# Patient Record
Sex: FEMALE | Race: WHITE | NOT HISPANIC OR LATINO | Employment: OTHER | URBAN - METROPOLITAN AREA
[De-identification: names, ages, dates, MRNs, and addresses within clinical notes are randomized per-mention and may not be internally consistent; named-entity substitution may affect disease eponyms.]

---

## 2017-02-07 ENCOUNTER — OFFICE VISIT - HEALTHEAST (OUTPATIENT)
Dept: FAMILY MEDICINE | Facility: CLINIC | Age: 62
End: 2017-02-07

## 2017-02-07 DIAGNOSIS — K12.0 APHTHOUS ULCER: ICD-10-CM

## 2017-02-07 DIAGNOSIS — J00 ACUTE NASOPHARYNGITIS: ICD-10-CM

## 2017-02-07 DIAGNOSIS — R59.0 ANTERIOR CERVICAL LYMPHADENOPATHY: ICD-10-CM

## 2017-02-07 ASSESSMENT — MIFFLIN-ST. JEOR: SCORE: 992.2

## 2017-03-03 ENCOUNTER — COMMUNICATION - HEALTHEAST (OUTPATIENT)
Dept: TELEHEALTH | Facility: CLINIC | Age: 62
End: 2017-03-03

## 2017-03-17 ENCOUNTER — RECORDS - HEALTHEAST (OUTPATIENT)
Dept: ADMINISTRATIVE | Facility: OTHER | Age: 62
End: 2017-03-17

## 2017-03-17 ENCOUNTER — OFFICE VISIT - HEALTHEAST (OUTPATIENT)
Dept: INTERNAL MEDICINE | Facility: CLINIC | Age: 62
End: 2017-03-17

## 2017-03-17 DIAGNOSIS — M85.80 OSTEOPENIA: ICD-10-CM

## 2017-03-17 DIAGNOSIS — R07.9 CHEST PAIN, UNSPECIFIED TYPE: ICD-10-CM

## 2017-03-17 DIAGNOSIS — Z12.11 SCREENING FOR COLON CANCER: ICD-10-CM

## 2017-03-17 DIAGNOSIS — R93.7 ABNORMAL BONE DENSITY SCREENING: ICD-10-CM

## 2017-03-17 DIAGNOSIS — Z13.1 SCREENING FOR DIABETES MELLITUS: ICD-10-CM

## 2017-03-17 DIAGNOSIS — R59.0 LOCALIZED ENLARGED LYMPH NODES: ICD-10-CM

## 2017-03-17 DIAGNOSIS — Z00.00 ROUTINE GENERAL MEDICAL EXAMINATION AT A HEALTH CARE FACILITY: ICD-10-CM

## 2017-03-17 DIAGNOSIS — E78.49 FAMILIAL HYPERLIPIDEMIA: ICD-10-CM

## 2017-03-17 DIAGNOSIS — Z12.31 OTHER SCREENING MAMMOGRAM: ICD-10-CM

## 2017-03-17 LAB
CHOLEST SERPL-MCNC: 213 MG/DL
FASTING STATUS PATIENT QL REPORTED: ABNORMAL
HBA1C MFR BLD: 5.8 % (ref 3.5–6)
HDLC SERPL-MCNC: 102 MG/DL
LDLC SERPL CALC-MCNC: 100 MG/DL
TRIGL SERPL-MCNC: 57 MG/DL

## 2017-03-17 RX ORDER — SAW/VIT E/SOD SEL/LYC/BETA/PYG 160-100
1 TABLET ORAL DAILY
Status: SHIPPED | COMMUNITY
Start: 2016-04-13 | End: 2021-11-30 | Stop reason: ALTCHOICE

## 2017-03-17 ASSESSMENT — MIFFLIN-ST. JEOR: SCORE: 994.48

## 2017-03-20 ENCOUNTER — COMMUNICATION - HEALTHEAST (OUTPATIENT)
Dept: INTERNAL MEDICINE | Facility: CLINIC | Age: 62
End: 2017-03-20

## 2017-03-23 ENCOUNTER — HOSPITAL ENCOUNTER (OUTPATIENT)
Dept: MAMMOGRAPHY | Facility: CLINIC | Age: 62
Discharge: HOME | End: 2017-03-23
Attending: INTERNAL MEDICINE

## 2017-03-23 ENCOUNTER — RECORDS - HEALTHEAST (OUTPATIENT)
Dept: BONE DENSITY | Facility: CLINIC | Age: 62
End: 2017-03-23

## 2017-03-23 ENCOUNTER — HOSPITAL ENCOUNTER (OUTPATIENT)
Dept: ULTRASOUND IMAGING | Facility: CLINIC | Age: 62
Discharge: HOME | End: 2017-03-23
Attending: INTERNAL MEDICINE

## 2017-03-23 ENCOUNTER — RECORDS - HEALTHEAST (OUTPATIENT)
Dept: ADMINISTRATIVE | Facility: OTHER | Age: 62
End: 2017-03-23

## 2017-03-23 ENCOUNTER — COMMUNICATION - HEALTHEAST (OUTPATIENT)
Dept: CARDIOLOGY | Facility: CLINIC | Age: 62
End: 2017-03-23

## 2017-03-23 DIAGNOSIS — M85.80 OTHER SPECIFIED DISORDERS OF BONE DENSITY AND STRUCTURE, UNSPECIFIED SITE: ICD-10-CM

## 2017-03-23 DIAGNOSIS — Z12.31 OTHER SCREENING MAMMOGRAM: ICD-10-CM

## 2017-03-23 DIAGNOSIS — N63.0 BREAST LUMP: ICD-10-CM

## 2017-03-23 DIAGNOSIS — R59.0 LOCALIZED ENLARGED LYMPH NODES: ICD-10-CM

## 2017-03-27 ENCOUNTER — COMMUNICATION - HEALTHEAST (OUTPATIENT)
Dept: INTERNAL MEDICINE | Facility: CLINIC | Age: 62
End: 2017-03-27

## 2017-03-28 ENCOUNTER — HOSPITAL ENCOUNTER (OUTPATIENT)
Dept: CT IMAGING | Facility: CLINIC | Age: 62
Discharge: HOME | End: 2017-03-28
Attending: INTERNAL MEDICINE

## 2017-03-28 DIAGNOSIS — R07.9 CHEST PAIN, UNSPECIFIED TYPE: ICD-10-CM

## 2017-03-28 DIAGNOSIS — E78.49 FAMILIAL HYPERLIPIDEMIA: ICD-10-CM

## 2017-03-28 LAB
BSA FOR ECHO PROCEDURE: 1.44 M2
CV CALCIUM SCORE AGATSTON LM: 0
CV CALCIUM SCORING AGATSON LAD: 4
CV CALCIUM SCORING AGATSTON CX: 0
CV CALCIUM SCORING AGATSTON RCA: 0
CV CALCIUM SCORING AGATSTON TOTAL: 4
LEFT VENTRICLE HEART RATE: 60 BPM

## 2017-03-28 ASSESSMENT — MIFFLIN-ST. JEOR: SCORE: 994.48

## 2017-03-29 ENCOUNTER — AMBULATORY - HEALTHEAST (OUTPATIENT)
Dept: INTERNAL MEDICINE | Facility: CLINIC | Age: 62
End: 2017-03-29

## 2017-03-29 DIAGNOSIS — I25.10 ASCVD (ARTERIOSCLEROTIC CARDIOVASCULAR DISEASE): ICD-10-CM

## 2017-04-14 ENCOUNTER — OFFICE VISIT - HEALTHEAST (OUTPATIENT)
Dept: CARDIOLOGY | Facility: CLINIC | Age: 62
End: 2017-04-14

## 2017-04-14 DIAGNOSIS — I25.10 CORONARY ARTERY DISEASE INVOLVING NATIVE CORONARY ARTERY WITHOUT ANGINA PECTORIS: ICD-10-CM

## 2017-04-14 ASSESSMENT — MIFFLIN-ST. JEOR: SCORE: 994.48

## 2017-05-23 ENCOUNTER — RECORDS - HEALTHEAST (OUTPATIENT)
Dept: ADMINISTRATIVE | Facility: OTHER | Age: 62
End: 2017-05-23

## 2017-09-13 ENCOUNTER — OFFICE VISIT - HEALTHEAST (OUTPATIENT)
Dept: INTERNAL MEDICINE | Facility: CLINIC | Age: 62
End: 2017-09-13

## 2017-09-13 DIAGNOSIS — M79.604 RIGHT LEG PAIN: ICD-10-CM

## 2017-09-13 DIAGNOSIS — I25.10 CORONARY ARTERY DISEASE INVOLVING NATIVE CORONARY ARTERY WITHOUT ANGINA PECTORIS: ICD-10-CM

## 2017-09-13 DIAGNOSIS — E78.49 FAMILIAL HYPERLIPIDEMIA: ICD-10-CM

## 2017-09-13 ASSESSMENT — MIFFLIN-ST. JEOR: SCORE: 1003.55

## 2018-02-05 ENCOUNTER — RECORDS - HEALTHEAST (OUTPATIENT)
Dept: LAB | Facility: CLINIC | Age: 63
End: 2018-02-05

## 2018-02-05 LAB
CHOLEST SERPL-MCNC: 210 MG/DL
FASTING STATUS PATIENT QL REPORTED: ABNORMAL
FASTING STATUS PATIENT QL REPORTED: NORMAL
GLUCOSE BLD-MCNC: 78 MG/DL (ref 70–125)
HDLC SERPL-MCNC: 98 MG/DL
LDLC SERPL CALC-MCNC: 102 MG/DL
TRIGL SERPL-MCNC: 48 MG/DL

## 2018-02-06 LAB — HBA1C MFR BLD: 5.8 % (ref 4.2–6.1)

## 2018-02-27 ENCOUNTER — COMMUNICATION - HEALTHEAST (OUTPATIENT)
Dept: INTERNAL MEDICINE | Facility: CLINIC | Age: 63
End: 2018-02-27

## 2018-03-22 ENCOUNTER — COMMUNICATION - HEALTHEAST (OUTPATIENT)
Dept: INTERNAL MEDICINE | Facility: CLINIC | Age: 63
End: 2018-03-22

## 2018-04-25 ENCOUNTER — AMBULATORY - HEALTHEAST (OUTPATIENT)
Dept: INTERNAL MEDICINE | Facility: CLINIC | Age: 63
End: 2018-04-25

## 2018-04-26 ENCOUNTER — OFFICE VISIT - HEALTHEAST (OUTPATIENT)
Dept: INTERNAL MEDICINE | Facility: CLINIC | Age: 63
End: 2018-04-26

## 2018-04-26 ENCOUNTER — COMMUNICATION - HEALTHEAST (OUTPATIENT)
Dept: INTERNAL MEDICINE | Facility: CLINIC | Age: 63
End: 2018-04-26

## 2018-04-26 DIAGNOSIS — Z12.4 SCREENING FOR CERVICAL CANCER: ICD-10-CM

## 2018-04-26 DIAGNOSIS — Z00.00 ROUTINE GENERAL MEDICAL EXAMINATION AT A HEALTH CARE FACILITY: ICD-10-CM

## 2018-04-26 DIAGNOSIS — M85.80 OSTEOPENIA: ICD-10-CM

## 2018-04-26 DIAGNOSIS — I25.10 CORONARY ARTERY DISEASE INVOLVING NATIVE CORONARY ARTERY WITHOUT ANGINA PECTORIS: ICD-10-CM

## 2018-04-26 ASSESSMENT — MIFFLIN-ST. JEOR: SCORE: 1008.08

## 2018-09-15 ENCOUNTER — OFFICE VISIT - HEALTHEAST (OUTPATIENT)
Dept: FAMILY MEDICINE | Facility: CLINIC | Age: 63
End: 2018-09-15

## 2018-09-15 DIAGNOSIS — J06.9 URI (UPPER RESPIRATORY INFECTION): ICD-10-CM

## 2019-03-14 ENCOUNTER — COMMUNICATION - HEALTHEAST (OUTPATIENT)
Dept: TELEHEALTH | Facility: CLINIC | Age: 64
End: 2019-03-14

## 2019-03-23 ENCOUNTER — OFFICE VISIT - HEALTHEAST (OUTPATIENT)
Dept: FAMILY MEDICINE | Facility: CLINIC | Age: 64
End: 2019-03-23

## 2019-03-23 DIAGNOSIS — S49.92XA ARM INJURY, LEFT, INITIAL ENCOUNTER: ICD-10-CM

## 2019-03-23 ASSESSMENT — MIFFLIN-ST. JEOR: SCORE: 1003.09

## 2019-03-24 ENCOUNTER — ANESTHESIA - HEALTHEAST (OUTPATIENT)
Dept: SURGERY | Facility: CLINIC | Age: 64
End: 2019-03-24

## 2019-03-24 ENCOUNTER — SURGERY - HEALTHEAST (OUTPATIENT)
Dept: SURGERY | Facility: CLINIC | Age: 64
End: 2019-03-24

## 2019-04-16 ENCOUNTER — RECORDS - HEALTHEAST (OUTPATIENT)
Dept: ADMINISTRATIVE | Facility: OTHER | Age: 64
End: 2019-04-16

## 2019-05-10 ENCOUNTER — COMMUNICATION - HEALTHEAST (OUTPATIENT)
Dept: ADMINISTRATIVE | Facility: CLINIC | Age: 64
End: 2019-05-10

## 2019-05-14 ENCOUNTER — RECORDS - HEALTHEAST (OUTPATIENT)
Dept: ADMINISTRATIVE | Facility: OTHER | Age: 64
End: 2019-05-14

## 2019-05-22 ENCOUNTER — OFFICE VISIT - HEALTHEAST (OUTPATIENT)
Dept: INTERNAL MEDICINE | Facility: CLINIC | Age: 64
End: 2019-05-22

## 2019-05-22 DIAGNOSIS — Z00.00 ROUTINE GENERAL MEDICAL EXAMINATION AT A HEALTH CARE FACILITY: ICD-10-CM

## 2019-05-22 DIAGNOSIS — S42.202A CLOSED FRACTURE OF PROXIMAL END OF LEFT HUMERUS, UNSPECIFIED FRACTURE MORPHOLOGY, INITIAL ENCOUNTER: ICD-10-CM

## 2019-05-22 DIAGNOSIS — Z12.31 VISIT FOR SCREENING MAMMOGRAM: ICD-10-CM

## 2019-05-22 DIAGNOSIS — E78.49 FAMILIAL HYPERLIPIDEMIA: ICD-10-CM

## 2019-05-22 DIAGNOSIS — I25.10 CORONARY ARTERY DISEASE INVOLVING NATIVE CORONARY ARTERY OF NATIVE HEART WITHOUT ANGINA PECTORIS: ICD-10-CM

## 2019-05-22 DIAGNOSIS — M85.80 OSTEOPENIA, UNSPECIFIED LOCATION: ICD-10-CM

## 2019-05-22 RX ORDER — LORAZEPAM 0.5 MG/1
0.5 TABLET ORAL
Qty: 30 TABLET | Refills: 0 | Status: SHIPPED | OUTPATIENT
Start: 2019-05-22 | End: 2021-11-30

## 2019-05-22 RX ORDER — ATORVASTATIN CALCIUM 40 MG/1
40 TABLET, FILM COATED ORAL DAILY
Qty: 90 TABLET | Refills: 3 | Status: SHIPPED | OUTPATIENT
Start: 2019-05-22 | End: 2021-11-30 | Stop reason: ALTCHOICE

## 2019-05-22 RX ORDER — IBUPROFEN 200 MG
400 TABLET ORAL PRN
Status: SHIPPED | COMMUNITY
Start: 2019-05-22 | End: 2021-11-30

## 2019-05-22 ASSESSMENT — MIFFLIN-ST. JEOR: SCORE: 1001.28

## 2019-05-23 ENCOUNTER — COMMUNICATION - HEALTHEAST (OUTPATIENT)
Dept: INTERNAL MEDICINE | Facility: CLINIC | Age: 64
End: 2019-05-23

## 2019-05-23 DIAGNOSIS — S42.202A CLOSED FRACTURE OF PROXIMAL END OF LEFT HUMERUS, UNSPECIFIED FRACTURE MORPHOLOGY, INITIAL ENCOUNTER: ICD-10-CM

## 2019-05-23 RX ORDER — MELOXICAM 15 MG/1
15 TABLET ORAL DAILY
Qty: 15 TABLET | Refills: 0 | Status: SHIPPED | OUTPATIENT
Start: 2019-05-23 | End: 2021-11-30

## 2019-05-24 ENCOUNTER — AMBULATORY - HEALTHEAST (OUTPATIENT)
Dept: LAB | Facility: CLINIC | Age: 64
End: 2019-05-24

## 2019-05-24 DIAGNOSIS — M85.80 OSTEOPENIA, UNSPECIFIED LOCATION: ICD-10-CM

## 2019-05-24 DIAGNOSIS — I25.10 CORONARY ARTERY DISEASE INVOLVING NATIVE CORONARY ARTERY OF NATIVE HEART WITHOUT ANGINA PECTORIS: ICD-10-CM

## 2019-05-24 DIAGNOSIS — E78.49 FAMILIAL HYPERLIPIDEMIA: ICD-10-CM

## 2019-05-24 LAB
ANION GAP SERPL CALCULATED.3IONS-SCNC: 7 MMOL/L (ref 5–18)
BUN SERPL-MCNC: 15 MG/DL (ref 8–22)
CALCIUM SERPL-MCNC: 9 MG/DL (ref 8.5–10.5)
CHLORIDE BLD-SCNC: 103 MMOL/L (ref 98–107)
CHOLEST SERPL-MCNC: 183 MG/DL
CO2 SERPL-SCNC: 26 MMOL/L (ref 22–31)
CREAT SERPL-MCNC: 0.71 MG/DL (ref 0.6–1.1)
ERYTHROCYTE [DISTWIDTH] IN BLOOD BY AUTOMATED COUNT: 11.4 % (ref 11–14.5)
FASTING STATUS PATIENT QL REPORTED: YES
GFR SERPL CREATININE-BSD FRML MDRD: >60 ML/MIN/1.73M2
GLUCOSE BLD-MCNC: 86 MG/DL (ref 70–125)
HCT VFR BLD AUTO: 34.1 % (ref 35–47)
HDLC SERPL-MCNC: 90 MG/DL
HGB BLD-MCNC: 11.2 G/DL (ref 12–16)
LDLC SERPL CALC-MCNC: 85 MG/DL
MCH RBC QN AUTO: 30.9 PG (ref 27–34)
MCHC RBC AUTO-ENTMCNC: 32.8 G/DL (ref 32–36)
MCV RBC AUTO: 94 FL (ref 80–100)
PLATELET # BLD AUTO: 232 THOU/UL (ref 140–440)
PMV BLD AUTO: 7.6 FL (ref 7–10)
POTASSIUM BLD-SCNC: 4.3 MMOL/L (ref 3.5–5)
RBC # BLD AUTO: 3.62 MILL/UL (ref 3.8–5.4)
SODIUM SERPL-SCNC: 136 MMOL/L (ref 136–145)
TRIGL SERPL-MCNC: 41 MG/DL
TSH SERPL DL<=0.005 MIU/L-ACNC: 1 UIU/ML (ref 0.3–5)
WBC: 3.9 THOU/UL (ref 4–11)

## 2019-05-27 LAB
25(OH)D3 SERPL-MCNC: 40.3 NG/ML (ref 30–80)
25(OH)D3 SERPL-MCNC: 40.3 NG/ML (ref 30–80)

## 2019-06-12 ENCOUNTER — COMMUNICATION - HEALTHEAST (OUTPATIENT)
Dept: CARDIOLOGY | Facility: CLINIC | Age: 64
End: 2019-06-12

## 2019-06-13 ENCOUNTER — COMMUNICATION - HEALTHEAST (OUTPATIENT)
Dept: INTERNAL MEDICINE | Facility: CLINIC | Age: 64
End: 2019-06-13

## 2019-06-13 ENCOUNTER — COMMUNICATION - HEALTHEAST (OUTPATIENT)
Dept: LAB | Facility: CLINIC | Age: 64
End: 2019-06-13

## 2019-06-13 ENCOUNTER — AMBULATORY - HEALTHEAST (OUTPATIENT)
Dept: INTERNAL MEDICINE | Facility: CLINIC | Age: 64
End: 2019-06-13

## 2019-06-13 DIAGNOSIS — D64.9 ANEMIA: ICD-10-CM

## 2019-06-20 ENCOUNTER — AMBULATORY - HEALTHEAST (OUTPATIENT)
Dept: LAB | Facility: CLINIC | Age: 64
End: 2019-06-20

## 2019-06-20 DIAGNOSIS — D64.9 ANEMIA: ICD-10-CM

## 2019-06-20 LAB
ERYTHROCYTE [DISTWIDTH] IN BLOOD BY AUTOMATED COUNT: 11.3 % (ref 11–14.5)
HCT VFR BLD AUTO: 35.9 % (ref 35–47)
HGB BLD-MCNC: 12 G/DL (ref 12–16)
MCH RBC QN AUTO: 31.4 PG (ref 27–34)
MCHC RBC AUTO-ENTMCNC: 33.3 G/DL (ref 32–36)
MCV RBC AUTO: 94 FL (ref 80–100)
PLATELET # BLD AUTO: 237 THOU/UL (ref 140–440)
PMV BLD AUTO: 6.8 FL (ref 7–10)
RBC # BLD AUTO: 3.81 MILL/UL (ref 3.8–5.4)
WBC: 5 THOU/UL (ref 4–11)

## 2019-06-26 ENCOUNTER — RECORDS - HEALTHEAST (OUTPATIENT)
Dept: ADMINISTRATIVE | Facility: OTHER | Age: 64
End: 2019-06-26

## 2019-07-11 ENCOUNTER — RECORDS - HEALTHEAST (OUTPATIENT)
Dept: ADMINISTRATIVE | Facility: OTHER | Age: 64
End: 2019-07-11

## 2019-07-11 ENCOUNTER — RECORDS - HEALTHEAST (OUTPATIENT)
Dept: BONE DENSITY | Facility: CLINIC | Age: 64
End: 2019-07-11

## 2019-07-11 DIAGNOSIS — M85.80 OTHER SPECIFIED DISORDERS OF BONE DENSITY AND STRUCTURE, UNSPECIFIED SITE: ICD-10-CM

## 2019-08-07 ENCOUNTER — OFFICE VISIT - HEALTHEAST (OUTPATIENT)
Dept: CARDIOLOGY | Facility: CLINIC | Age: 64
End: 2019-08-07

## 2019-08-07 DIAGNOSIS — I25.10 CORONARY ARTERY DISEASE INVOLVING NATIVE CORONARY ARTERY OF NATIVE HEART WITHOUT ANGINA PECTORIS: ICD-10-CM

## 2019-08-07 ASSESSMENT — MIFFLIN-ST. JEOR: SCORE: 1005.81

## 2019-09-04 ENCOUNTER — HOSPITAL ENCOUNTER (OUTPATIENT)
Dept: MAMMOGRAPHY | Facility: CLINIC | Age: 64
Discharge: HOME | End: 2019-09-04
Attending: INTERNAL MEDICINE

## 2019-09-04 DIAGNOSIS — Z12.31 VISIT FOR SCREENING MAMMOGRAM: ICD-10-CM

## 2020-04-27 ENCOUNTER — COMMUNICATION - HEALTHEAST (OUTPATIENT)
Dept: INTERNAL MEDICINE | Facility: CLINIC | Age: 65
End: 2020-04-27

## 2020-04-27 DIAGNOSIS — E78.49 FAMILIAL HYPERLIPIDEMIA: ICD-10-CM

## 2021-05-26 NOTE — PROGRESS NOTES
Chief Complaint   Patient presents with     Shoulder Pain     left side- fell today slipped on ice while getting the paper and fell on her left elbow         HPI      Patient is here for left arm pain after a fall this AM. While going to get the paper, she stepped on ice and fell on her left elbow causing severe pain of left elbow and left shoulder. Any movement worsens the pain. She denied hitting her head, headache, back pain, left wrist pain, left hand pain, chest pain, shortness of breath, abdominal pain, hips and lower extremities pain. She took Ibuprofen at 6 am today without much relief.    ROS: Pertinent ROS noted in HPI.     No Known Allergies    Patient Active Problem List   Diagnosis     Osteopenia - DXA 2015, s/p 5 years bisphos     Familial hyperlipidemia     Coronary artery disease involving native coronary artery without angina pectoris       Family History   Problem Relation Age of Onset     Pulmonary fibrosis Mother      Coronary artery disease Mother      Valvular heart disease Mother      Alzheimer's disease Father      Coronary artery disease Father      Hyperlipidemia Sister      Coronary artery disease Brother      Breast cancer Sister 59     No Medical Problems Brother      Other Brother         leukodystrophy       Social History     Socioeconomic History     Marital status:      Spouse name: Not on file     Number of children: Not on file     Years of education: Not on file     Highest education level: Not on file   Occupational History     Not on file   Social Needs     Financial resource strain: Not on file     Food insecurity:     Worry: Not on file     Inability: Not on file     Transportation needs:     Medical: Not on file     Non-medical: Not on file   Tobacco Use     Smoking status: Never Smoker     Smokeless tobacco: Never Used   Substance and Sexual Activity     Alcohol use: Yes     Alcohol/week: 1.2 oz     Types: 2 Standard drinks or equivalent per week     Drug use: No      Sexual activity: Yes     Partners: Male   Lifestyle     Physical activity:     Days per week: Not on file     Minutes per session: Not on file     Stress: Not on file   Relationships     Social connections:     Talks on phone: Not on file     Gets together: Not on file     Attends Alevism service: Not on file     Active member of club or organization: Not on file     Attends meetings of clubs or organizations: Not on file     Relationship status: Not on file     Intimate partner violence:     Fear of current or ex partner: Not on file     Emotionally abused: Not on file     Physically abused: Not on file     Forced sexual activity: Not on file   Other Topics Concern     Not on file   Social History Narrative    , [place].  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           Objective:    Vitals:    03/23/19 0821   BP: 168/73   Pulse: 73   Resp: 16   Temp: (!) 95  F (35  C)   SpO2: 99%       Gen:NAD  MSK: patient held her left arm flexed at slightly over 90 degrees and was not able to move it at all. Normal inspection of left clavicle, shoulder, and left arm otherwise. Normal palpation of left clavicle. Moderate pain to palpation at left shoulder at the AC joint area, and left proximal upper arm. Normal palpation of left elbow, forearm, wrist and hand. Unable to assess range of motion and strength due to pain.      Assessment & Plan:    Arm injury, left, initial encounter - concern for fracture given severity of pain. Unable to perform optimal exam due to pain. I think patient needs pain meds to allow complete exam with subsequent imaging. Recommended ER care. Spoke with [facility] ER provider. Patient going to ER with her .

## 2021-05-27 NOTE — ANESTHESIA PREPROCEDURE EVALUATION
Anesthesia Evaluation      Patient summary reviewed   No history of anesthetic complications     Airway   Mallampati: II  Neck ROM: full   Pulmonary - negative ROS and normal exam                          Cardiovascular - negative ROS and normal exam  (+) CAD, , hypercholesterolemia,      Neuro/Psych - negative ROS     Endo/Other - negative ROS      GI/Hepatic/Renal - negative ROS           Dental - normal exam                        Anesthesia Plan  Planned anesthetic: general endotracheal and peripheral nerve block  Ketamine, Mg for POA.  Zofran, decadron.  Consent obtained for possible interscalene block for POA in PACU.  ASA 2   Induction: intravenous   Anesthetic plan and risks discussed with: patient    Post-op plan: routine recovery           Pt called back states his pharmacy called him and told him that his insurance has been denied please call pharmacy back to approve his requested medication.

## 2021-05-27 NOTE — ANESTHESIA POSTPROCEDURE EVALUATION
Patient: Neeta Nesbitt  OPEN REDUCTION INTERNAL FIXATION, FRACTURE, HUMERUS, PROXIMAL LEFT - synthes locking plate and screws  Anesthesia type: general    Patient location: PACU  Last vitals:   Vitals:    03/24/19 1145   BP: 114/62   Pulse: 76   Resp: 12   Temp: 37.1  C (98.7  F)   SpO2: 98%     Post vital signs: stable  Level of consciousness: awake and responds to simple questions  Post-anesthesia pain: pain controlled  Post-anesthesia nausea and vomiting: no  Pulmonary: unassisted, return to baseline  Cardiovascular: stable and blood pressure at baseline  Hydration: adequate  Anesthetic events: no    QCDR Measures:  ASA# 11 - Roma-op Cardiac Arrest: ASA11B - Patient did NOT experience unanticipated cardiac arrest  ASA# 12 - Roma-op Mortality Rate: ASA12B - Patient did NOT die  ASA# 13 - PACU Re-Intubation Rate: ASA13B - Patient did NOT require a new airway mgmt  ASA# 10 - Composite Anes Safety: ASA10A - No serious adverse event    Additional Notes:    Patient tolerating a pain of 5/10.  Decision made to forego post-op pain block in lieu of already-given ketamine, narcotic, and magnesium.

## 2021-05-27 NOTE — ANESTHESIA CARE TRANSFER NOTE
Last vitals:   Vitals:    03/24/19 1018   BP: 129/62   Pulse: (!) 109   Resp: 16   Temp: 36.3  C (97.4  F)   SpO2: 100%     Patient's level of consciousness is awake  Spontaneous respirations: yes  Maintains airway independently: yes  Dentition unchanged: yes  Oropharynx: oropharynx clear of all foreign objects    QCDR Measures:  ASA# 20 - Surgical Safety Checklist: WHO surgical safety checklist completed prior to induction    PQRS# 430 - Adult PONV Prevention: 4558F - Pt received => 2 anti-emetic agents (different classes) preop & intraop  ASA# 8 - Peds PONV Prevention: 4558F - Pt received => 2 anti-emetic agents (different classes) preop & intraop  PQRS# 424 - Roma-op Temp Management: 4559F - At least one body temp DOCUMENTED => 35.5C or 95.9F within required timeframe  PQRS# 426 - PACU Transfer Protocol: - Transfer of care checklist used  ASA# 14 - Acute Post-op Pain: ASA14B - Patient did NOT experience pain >= 7 out of 10

## 2021-05-29 NOTE — TELEPHONE ENCOUNTER
Dr Wolfe    Fax received from [facility], insurance plan does not cover the below medication for more than 1 tablet daily  meloxicam (MOBIC) 7.5 MG tablet 60 tablet 0 5/22/2019     Sig - Route: Take 1 tablet (7.5 mg total) by mouth 2 (two) times a day. - Oral    Sent to pharmacy as: meloxicam (MOBIC) 7.5 MG tablet    E-Prescribing Status: Receipt confirmed by pharmacy (5/22/2019  1:26 PM CDT)            Covered Alternatives: Meloxicam 15mg tablet- 1/2 tablet twice daily    Will you consider changing to a covered alternative?    If you prefer that the patient stays on this medciation and you would like to start the PA process, please send this encounter to the [facility] MED pool       Insurance Plan: [employer]  Patient ID: not provided on fax

## 2021-05-29 NOTE — TELEPHONE ENCOUNTER
"Patient has lab appointment on 7/13 and there are no orders. Notes say \"labs - blood work \". Please place orders.  "

## 2021-05-29 NOTE — TELEPHONE ENCOUNTER
I believe this is an error- no lab appt scheduled for 7/13- I think this was for 6/13 which has already been ordered and resulted

## 2021-05-29 NOTE — PATIENT INSTRUCTIONS - HE
- take acetaminophen arthritis strength (650mg) 2 tabs three times daily  - take meloxicam 7.5mg twice daily for 2- 4 weeks  - avoid ibuprofen while on meloxicam  - try lorazepam at night to help with sleep and relax shoulder

## 2021-05-29 NOTE — PROGRESS NOTES
Office Visit - Physical   Neeta Nesbitt   63 y.o.  female    Date of visit: 5/22/2019  Physician: Donavon Wolfe MD     Assessment and Plan   1. Routine general medical examination at a health care facility  This is a generally healthy 63-year-old woman with issues as discussed below.  Ongoing healthy lifestyle discussed and recommended    2. Visit for screening mammogram  - Mammo Screening Bilateral; Future    3. Osteopenia, unspecified location  - DXA Bone Density Scan; Future  - Vitamin D, Total (25-Hydroxy); Future    4. Familial hyperlipidemia  - atorvastatin (LIPITOR) 40 MG tablet; Take 1 tablet (40 mg total) by mouth daily.  Dispense: 90 tablet; Refill: 3  - Lipid Cascade; Future  - Thyroid [place]; Future    5. Coronary artery disease involving native coronary artery of native heart without angina pectoris  No history of myocardial infarction or other vascular event, atherosclerosis noted on CT angiogram  - Basic Metabolic Panel; Future  - HM2(CBC w/o Differential); Future    6. Closed fracture of proximal end of left humerus, unspecified fracture morphology, initial encounter  Still with significant pain in the arm and shoulder.  She almost has some frozen shoulder type presentation.  She is working with physical therapy.  I have asked her to schedule acetaminophen 1300 mg arthritis strength 3 times a day for the next 2 to 4 weeks as well as meloxicam 7.5 mg daily for the next 2 to 4 weeks.  She can also take lorazepam at night to help with sleep and relaxation and we discussed a limited course of this, 4 to 6 weeks.  She sees Dr. Ugalde at the end of this month.  - meloxicam (MOBIC) 7.5 MG tablet; Take 1 tablet (7.5 mg total) by mouth 2 (two) times a day.  Dispense: 60 tablet; Refill: 0  - LORazepam (ATIVAN) 0.5 MG tablet; Take 1 tablet (0.5 mg total) by mouth at bedtime as needed for anxiety.  Dispense: 30 tablet; Refill: 0    Return in about 3 months (around 8/22/2019) for recheck.     Chief  Complaint   Chief Complaint   Patient presents with     Annual Exam        Patient Profile   Social History     Social History Narrative    , [place].  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          Past Medical History   Patient Active Problem List   Diagnosis     Osteopenia - DXA 2017, s/p 5 years bisphos     Familial hyperlipidemia     Coronary artery disease involving native coronary artery without angina pectoris     Closed fracture of proximal end of left humerus, unspecified fracture morphology, initial encounter       Past Surgical History  She has a past surgical history that includes ORIF humerus fracture (Left, 03/2019).     History of Present Illness   This 63 y.o. old woman comes in for annual physical.  Additionally she has been struggling with left arm and shoulder pain after humerus fracture at the end of March with a mechanical fall on the ice.  She had surgery and is doing physical therapy.  She has had a lot of pain and has been using over-the-counter pain medication.  She did not tolerate a muscle relaxant.  She has had range of motion issues in the shoulder and some elbow and wrist pain.  She did have a fall downstairs after her surgery but repeat x-rays looked okay.  She is having a hard time sleeping at night waking up with pain.  She has been using minimal doses of ibuprofen and acetaminophen.  She is not interested in any opioid pain medication.  Otherwise feeling okay.  She is due for screening for osteopenia/porosis.  She has had no chest pain no shortness of breath.  She continues on statin and aspirin.    Review of Systems: A comprehensive review of systems was negative except as noted.     Medications and Allergies   Current Outpatient Medications   Medication Sig Dispense Refill     acetaminophen (TYLENOL) 500 MG tablet Take 2 tablets (1,000 mg total) by mouth 3 (three) times a day. (Patient taking differently: Take 500 mg by mouth as needed.       )  0     aspirin 81 MG EC  "tablet Take 1 tablet (81 mg total) by mouth daily.  0     aspirin-acetaminophen-caffeine (EXCEDRIN MIGRAINE) 250-250-65 mg per tablet Take 2 tablets by mouth every 6 (six) hours as needed for pain.       atorvastatin (LIPITOR) 40 MG tablet Take 1 tablet (40 mg total) by mouth daily. 90 tablet 3     ibuprofen (ADVIL) 200 MG tablet Take 400 mg by mouth as needed for pain.       lactobac cmb #3-fos-pantethine 300-250 million cell-mg cap Take 1 tablet by mouth daily.       omega-3/dha/epa/fish oil (FISH OIL-OMEGA-3 FATTY ACIDS) 300-1,000 mg capsule Take 1 g by mouth daily.       LORazepam (ATIVAN) 0.5 MG tablet Take 1 tablet (0.5 mg total) by mouth at bedtime as needed for anxiety. 30 tablet 0     meloxicam (MOBIC) 7.5 MG tablet Take 1 tablet (7.5 mg total) by mouth 2 (two) times a day. 60 tablet 0     No current facility-administered medications for this visit.      No Known Allergies     Family and Social History   Family History   Problem Relation Age of Onset     Pulmonary fibrosis Mother      Coronary artery disease Mother      Valvular heart disease Mother      Alzheimer's disease Father      Coronary artery disease Father      Hyperlipidemia Sister      Coronary artery disease Brother      Breast cancer Sister 59     No Medical Problems Brother      Other Brother         leukodystrophy        Social History     Tobacco Use     Smoking status: Never Smoker     Smokeless tobacco: Never Used   Substance Use Topics     Alcohol use: Yes     Alcohol/week: 1.2 oz     Types: 2 Standard drinks or equivalent per week     Drug use: No        Physical Exam   General Appearance:   No acute distress    /68 (Patient Site: Right Arm, Patient Position: Sitting, Cuff Size: Adult Regular)   Pulse 70   Ht 5' 4\" (1.626 m)   Wt 105 lb (47.6 kg)   SpO2 99%   BMI 18.02 kg/m      EYES: Eyelids, conjunctiva, and sclera were normal. Pupils were normal. Cornea, iris, and lens were normal bilaterally.  HEAD, EARS, NOSE, MOUTH, AND " THROAT: Head and face were normal. Hearing was normal to voice and the ears were normal to external exam. Nose appearance was normal and there was no discharge. Oropharynx was normal.  NECK: Neck appearance was normal. There were no neck masses and the thyroid was not enlarged.  RESPIRATORY: Breathing pattern was normal and the chest moved symmetrically.  Percussion/auscultatory percussion was normal.  Lung sounds were normal and there were no abnormal sounds.  CARDIOVASCULAR: Heart rate and rhythm were normal.  S1 and S2 were normal and there were no extra sounds or murmurs. Peripheral pulses in arms and legs were normal.  Jugular venous pressure was normal.  There was no peripheral edema.  GASTROINTESTINAL: The abdomen was normal in contour.  Bowel sounds were present.  Percussion detected no organ enlargement or tenderness.  Palpation detected no tenderness, mass, or enlarged organs.   MUSCULOSKELETAL: Skeletal configuration was normal and muscle mass was normal for age.  Decreased range of motion of the left shoulder in all directions  LYMPHATIC: There were no enlarged nodes.  SKIN/HAIR/NAILS: Skin color was normal.  There were no skin lesions.  Hair and nails were normal.  NEUROLOGIC: The patient was alert and oriented to person, place, time, and circumstance. Speech was normal. Cranial nerves were normal. Motor strength was normal for age. The patient was normally coordinated.  PSYCHIATRIC:  Mood and affect were normal and the patient had normal recent and remote memory. The patient's judgment and insight were normal.       Additional Information        Donavon Wolfe MD  Internal Medicine  Contact me at [phone]

## 2021-05-29 NOTE — TELEPHONE ENCOUNTER
Dr. Wolfe,    Would you like to prescribe the alternative or start a PA?    Please advise.    Carmen GALEAS LPN .......... 1:32 PM  05/23/19

## 2021-05-30 VITALS — WEIGHT: 100 LBS | HEIGHT: 65 IN | BODY MASS INDEX: 16.66 KG/M2

## 2021-05-30 VITALS — HEIGHT: 64 IN | BODY MASS INDEX: 17.58 KG/M2 | WEIGHT: 103 LBS

## 2021-05-30 VITALS — HEIGHT: 65 IN | BODY MASS INDEX: 16.66 KG/M2 | WEIGHT: 100 LBS

## 2021-05-31 VITALS — HEIGHT: 65 IN | WEIGHT: 102 LBS | BODY MASS INDEX: 17 KG/M2

## 2021-06-01 VITALS — BODY MASS INDEX: 17.16 KG/M2 | WEIGHT: 103 LBS | HEIGHT: 65 IN

## 2021-06-02 VITALS — BODY MASS INDEX: 17.96 KG/M2 | WEIGHT: 107.9 LBS

## 2021-06-02 VITALS — BODY MASS INDEX: 17.17 KG/M2 | WEIGHT: 103.2 LBS

## 2021-06-02 VITALS — HEIGHT: 64 IN | WEIGHT: 105.4 LBS | BODY MASS INDEX: 17.99 KG/M2

## 2021-06-03 VITALS — BODY MASS INDEX: 17.93 KG/M2 | HEIGHT: 64 IN | WEIGHT: 105 LBS

## 2021-06-03 VITALS — WEIGHT: 106 LBS | BODY MASS INDEX: 18.1 KG/M2 | HEIGHT: 64 IN

## 2021-06-08 NOTE — PROGRESS NOTES
"[facility]     SUBJECTIVE: Neeta Nesbitt is a 61 y.o. female who presents today with 3 weeks of hoarseness, runny nose, and sores in her mouth.  She also noticed some swelling glands in her neck over the past 1-2 weeks and wanted to come in for evaluation for this reason.  She says her daughter was sick about 5 weeks ago with similar symptoms, and her  had similar symptoms after her started.  She says she feels achy and overall tired.  Her symptoms are better than they were 3 weeks ago.    She has had sores in her mouth for says they come and go.  She usually uses mouthwash helps him go away.     ROS:   - HEENT: No sinus tenderness  - CV: No CP   - Resp: No SOB, difficulty breathing, +minimal cough    - Skin: No rashes     PMH:   Past Medical History:   Diagnosis Date     Hyperlipidemia           FH: non-contributory   SH: tobacco: denies    OBJECTIVE:    Vitals:   Visit Vitals     /70 (Patient Site: Right Arm, Patient Position: Sitting, Cuff Size: Adult Regular)     Pulse 66     Temp 98  F (36.7  C) (Oral)     Resp 18     Ht 5' 4\" (1.626 m)     Wt 103 lb (46.7 kg)     SpO2 98%     Breastfeeding No     BMI 17.68 kg/m2      BMI= Body mass index is 17.68 kg/(m^2).    General: Adult female, sitting on exam table, NAD  HEENT: PERRL, MMM.  Normal Conjunctiva, normal TMs bilaterally, no nasal drainage, no sinus tenderness, no pharyngeal erythema or tonsillar exudates. Small apthous ulcer present on right superior hard palate.  Neck: Supple, Several tender, enlarged ant cervical nodes. No post cervical LAD.  CV: RRR, no m/r/g  Pulm: CTAB, no wheezing, rhonchi or crackles  Ext: Warm, no edema  Psych: Calm, normal affect    ASSESSMENT AND PLAN:   Netea Nesbitt is a 61 y.o. female who presents today with 3 weeks horseness, sinus congestion, and swollen glands; in stable condition.     1. Acute nasopharyngitis     2. Anterior cervical lymphadenopathy     3. Aphthous ulcer       Based on today's history and " physical exam most consistent with a viral URI. She would like to avoid medications if possible so I provided her reassurance that this should improve over the next few weeks.  She says her insurance changed so she is going to find a new primary care provider soon.  We'll have her return if she develops new or worsening symptoms.    Options for treatment and/or follow-up care were reviewed with the patient. Neeta Nesbitt was engaged and actively involved in the decision making process. She verbalized understanding of the options discussed and was satisfied with the final plan.    Follow up above with PCP in a few weeks or sooner if develops new or worsening symptoms.    Erasmo Motta, DO

## 2021-06-09 NOTE — PROGRESS NOTES
Office Visit - Physical   Neeta Nesbitt   61 y.o.  female    Date of visit: 3/19/2017  Physician: Donavon Wolfe MD     Assessment and Plan   1. Screening for colon cancer  - Ambulatory referral for Colonoscopy    2. Abnormal bone density screening  S/p 5 year fosamx, diet exercise, last DXA 2015  - DXA Bone Density Scan; Future  - HM2(CBC w/o Differential)  - Comprehensive Metabolic Panel  - Vitamin D, Total (25-Hydroxy)  - Thyroid Cascade    4. Routine general medical examination at a health care facility  Generally health 62 y/o woman.  Discussed healthy diet, regular exercise.  Pap up to date, needs mammo, see cardiovascular risk assessment    5. Screening for diabetes mellitus  - Glycosylated Hemoglobin A1c    6. Familial hyperlipidemia  Quite high lipids and strong FH of CHD.  Given her symptoms of intermittent chest tightness, will obtain CTA cor and calcium score   - Lipid [place]  - CTA Coronary W Calcium Score; Future    7. Chest pain, unspecified type  Pt with possible familial hyperlipidemia.  CTA cor and calcium score, would help determine need for further intervention and possible escalation or de-escalation of statin therapy, consideration of newer treatment/studies  - CTA Coronary W Calcium Score; Future    8. Localized enlarged lymph nodes  1-2cm  Lymph node, soft, mobile in left axilla, will US with mammo  - US Axilla Non Vascular Left; Future    Return in about 4 weeks (around 4/14/2017) for recheck.     Chief Complaint   Chief Complaint   Patient presents with     Annual Exam     fasting        Patient Profile   Social History     Social History Narrative    , [place].  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          Past Medical History   There is no problem list on file for this patient.      Past Surgical History  She has a past surgical history that includes No past surgeries.     History of Present Illness   This 61 y.o. old woman comes in to [facility] care, annual  "physical.  She feels generally ok.  Sister just diagnosed with breast cancer.  Occasional chest tightness across her chest.  Occasionally with exertion.  No associated n/v, diaphoresis.  No sig radiation.  Has Hld.       Review of Systems: A comprehensive review of systems was negative except as noted.     Medications and Allergies   Current Outpatient Prescriptions   Medication Sig Dispense Refill     atorvastatin (LIPITOR) 40 MG tablet Take 1 tablet by mouth daily.       cod liver oil, bulk, 100 % Oil Take 1 tablet by mouth daily.       lactobac cmb #3-fos-pantethine 300-250 million cell-mg cap Take 1 tablet by mouth daily.       No current facility-administered medications for this visit.      No Known Allergies     Family and Social History   Family History   Problem Relation Age of Onset     Pulmonary fibrosis Mother      Coronary artery disease Mother      Valvular heart disease Mother      Alzheimer's disease Father      Coronary artery disease Father      Hyperlipidemia Sister      Coronary artery disease Brother      Breast cancer Sister      No Medical Problems Brother      Other Brother      leukodystrophy        Social History   Substance Use Topics     Smoking status: Never Smoker     Smokeless tobacco: Never Used     Alcohol use 1.2 oz/week     2 Standard drinks or equivalent per week        Physical Exam   General Appearance:   NAD    Visit Vitals     /64 (Patient Site: Left Arm, Patient Position: Sitting, Cuff Size: Adult Regular)     Pulse 73     Ht 5' 5\" (1.651 m)     Wt 100 lb (45.4 kg)     SpO2 99%     BMI 16.64 kg/m2       EYES: Eyelids, conjunctiva, and sclera were normal. Pupils were normal. Cornea, iris, and lens were normal bilaterally.  HEAD, EARS, NOSE, MOUTH, AND THROAT: Head and face were normal. Hearing was normal to voice and the ears were normal to external exam. Nose appearance was normal and there was no discharge. Oropharynx was normal.  NECK: Neck appearance was normal. " There were no neck masses and the thyroid was not enlarged.  RESPIRATORY: Breathing pattern was normal and the chest moved symmetrically.  Percussion/auscultatory percussion was normal.  Lung sounds were normal and there were no abnormal sounds.  CARDIOVASCULAR: Heart rate and rhythm were normal.  S1 and S2 were normal and there were no extra sounds or murmurs. Peripheral pulses in arms and legs were normal.  Jugular venous pressure was normal.  There was no peripheral edema.  GASTROINTESTINAL: The abdomen was normal in contour.  Bowel sounds were present.  Percussion detected no organ enlargement or tenderness.  Palpation detected no tenderness, mass, or enlarged organs.   MUSCULOSKELETAL: Skeletal configuration was normal and muscle mass was normal for age. Joint appearance was overall normal.  LYMPHATIC: 1-2cm soft, mobile node L axilla  SKIN/HAIR/NAILS: Skin color was normal.  There were no skin lesions.  Hair and nails were normal.  NEUROLOGIC: The patient was alert and oriented to person, place, time, and circumstance. Speech was normal. Cranial nerves were normal. Motor strength was normal for age. The patient was normally coordinated.  PSYCHIATRIC:  Mood and affect were normal and the patient had normal recent and remote memory. The patient's judgment and insight were normal.  CHEST WALL/BREASTS: normal breast exam, palpable node L axilla       Additional Information        Donavon Wolfe MD  Internal Medicine  Contact me at None

## 2021-06-12 NOTE — PROGRESS NOTES
Office Visit - Follow Up   Neeta Nesbitt   61 y.o. female    Date of Visit: 9/13/2017    Chief Complaint   Patient presents with     Leg Pain     Right leg         Assessment and Plan   1. Right leg pain  I think this is tendinopathy, could have some lumbar radiculopathy.  Very unlikely DVT will evaluate with d-dimer.  - D-dimer, Quantitative    2. Coronary artery disease involving native coronary artery without angina pectoris  She has been stable on statin and aspirin.  She does wonder whether statin could be contributing and I suggested she stop it for a month see if this pain gets better and then resume it.  We need to establish a pattern of it provoking this leg pain before we contributed to it    3. Familial hyperlipidemia  See above    Return in about 4 weeks (around 10/11/2017).     History of Present Illness   This 61 y.o. old woman comes in for evaluation of right leg pain.  Started about a week ago.  It seems a bit migratory.  She has had pain in the right lateral hip and buttocks area.  Will radiate down into her knee as well as ankle.  The day before this started she had been doing some potty exercises with her feet.  Pain seems worse when she is wearing tennis shoes and her foot is compressed and better when she is wearing sandals and her toes are open.  Pain is worse when she is driving the car and using accelerator.  Skin is improved with sandals and with rest.  She has had no leg swelling.  She was getting a massage from family members of the calf and was noted to have some tightness in her tendons.  Her sisters had a blood clot.  The patient herself is never had a blood clot.  She has no shortness of breath no pleuritic chest pain.  She has no weakness or numbness.    Review of Systems: A comprehensive review of systems was negative except as noted.     Medications, Allergies and Problem List   Reviewed and updated     Physical Exam   General Appearance:   No acute distress    /68 (Patient  "Site: Right Arm, Patient Position: Sitting, Cuff Size: Adult Regular)  Pulse (!) 59  Ht 5' 5\" (1.651 m)  Wt 102 lb (46.3 kg)  SpO2 98%  BMI 16.97 kg/m2    HEENT exam is unremarkable  Neck supple no thyromegaly or nodule palpable  Lymphatic no cervical lymphadenopathy  Cardiovascular regular rate and rhythm no murmur gallop or rub  Pulmonary lungs are clear to auscultation bilaterally  Gastrointestinall abdomen soft nontender nondistended no organomegaly  Neurologic exam is non focal  Psychiatric pleasant, no confusion or agitation   Musculoskeletal normal range of motion at the hip knees ankles she has no joint swelling.  No redness.  Her calves are not swollen.  Pulses are intact.  Sensation intact.  Strength intact.     Additional Information   Current Outpatient Prescriptions   Medication Sig Dispense Refill     aspirin 81 MG EC tablet Take 1 tablet (81 mg total) by mouth daily.  0     ASPIRIN/ACETAMINOPHEN/CAFFEINE (EXCEDRIN MIGRAINE ORAL) Take 2 tablets by mouth as needed.       atorvastatin (LIPITOR) 40 MG tablet Take 1 tablet (40 mg total) by mouth daily. 90 tablet 3     cod liver oil, bulk, 100 % Oil Take 1 tablet by mouth daily.       lactobac cmb #3-fos-pantethine 300-250 million cell-mg cap Take 1 tablet by mouth daily.       No current facility-administered medications for this visit.      No Known Allergies  Social History   Substance Use Topics     Smoking status: Never Smoker     Smokeless tobacco: Never Used     Alcohol use 1.2 oz/week     2 Standard drinks or equivalent per week       Review and/or order of clinical lab tests:  Review and/or order of radiology tests:  Review and/or order of medicine tests:  Discussion of test results with performing physician:  Decision to obtain old records and/or obtain history from someone other than the patient:  Review and summarization of old records and/or obtaining history from someone other than the patient and.or discussion of case with another health " care provider:  Independent visualization of image, tracing or specimen itself:    Time:      Donavon Wolfe MD

## 2021-06-15 PROBLEM — I25.10 CORONARY ARTERY DISEASE INVOLVING NATIVE CORONARY ARTERY WITHOUT ANGINA PECTORIS: Status: ACTIVE | Noted: 2017-04-14

## 2021-06-15 PROBLEM — E78.49 FAMILIAL HYPERLIPIDEMIA: Status: ACTIVE | Noted: 2017-03-19

## 2021-06-15 PROBLEM — M85.80 OSTEOPENIA: Status: ACTIVE | Noted: 2017-03-19

## 2021-06-16 PROBLEM — S42.202A CLOSED FRACTURE OF PROXIMAL END OF LEFT HUMERUS, UNSPECIFIED FRACTURE MORPHOLOGY, INITIAL ENCOUNTER: Status: ACTIVE | Noted: 2019-03-23

## 2021-06-17 NOTE — PROGRESS NOTES
Office Visit - Physical   Neeta Nesbitt   62 y.o.  female    Date of visit: 4/26/2018  Physician: Donavon Wolfe MD     Assessment and Plan   1. Routine general medical examination at a health care facility  Is a healthy 62-year-old woman in ongoing healthy lifestyle discussed and recommended    2. Screening for cervical cancer  - Ambulatory referral to Obstetrics / Gynecology    3. Coronary artery disease involving native coronary artery without angina pectoris  Continue aspirin and statin for primary prevention    4. Osteopenia  Continue with adequate calcium and vitamin D in her diet weightbearing exercise, follow-up DEXA 1 year    Return in about 1 year (around 4/26/2019) for annual physical.     Chief Complaint   Chief Complaint   Patient presents with     Annual Exam     fasting        Patient Profile   Social History     Social History Narrative    , [place].  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          Past Medical History   Patient Active Problem List   Diagnosis     Osteopenia - DXA 2015, s/p 5 years bisphos     Familial hyperlipidemia     Coronary artery disease involving native coronary artery without angina pectoris       Past Surgical History  She has a past surgical history that includes No past surgeries.     History of Present Illness   This 62 y.o. old comes in for annual physical.  Her medical history was reviewed, electronic medical records updated to reflect this note.  She has no complaints    Review of Systems: A comprehensive review of systems was negative except as noted.     Medications and Allergies   Current Outpatient Prescriptions   Medication Sig Dispense Refill     aspirin 81 MG EC tablet Take 1 tablet (81 mg total) by mouth daily.  0     ASPIRIN/ACETAMINOPHEN/CAFFEINE (EXCEDRIN MIGRAINE ORAL) Take 2 tablets by mouth as needed.       cod liver oil, bulk, 100 % Oil Take 1 tablet by mouth daily.       lactobac cmb #3-fos-pantethine 300-250 million cell-mg cap Take  "1 tablet by mouth daily.       atorvastatin (LIPITOR) 40 MG tablet Take 1 tablet (40 mg total) by mouth daily. 90 tablet 3     No current facility-administered medications for this visit.      No Known Allergies     Family and Social History   Family History   Problem Relation Age of Onset     Pulmonary fibrosis Mother      Coronary artery disease Mother      Valvular heart disease Mother      Alzheimer's disease Father      Coronary artery disease Father      Hyperlipidemia Sister      Coronary artery disease Brother      Breast cancer Sister 59     No Medical Problems Brother      Other Brother      leukodystrophy        Social History   Substance Use Topics     Smoking status: Never Smoker     Smokeless tobacco: Never Used     Alcohol use 1.2 oz/week     2 Standard drinks or equivalent per week        Physical Exam   General Appearance:   No acute distress    /70 (Patient Site: Left Arm, Patient Position: Sitting, Cuff Size: Adult Regular)  Pulse 60  Ht 5' 5\" (1.651 m)  Wt 103 lb (46.7 kg)  SpO2 99%  BMI 17.14 kg/m2    EYES: Eyelids, conjunctiva, and sclera were normal. Pupils were normal. Cornea, iris, and lens were normal bilaterally.  HEAD, EARS, NOSE, MOUTH, AND THROAT: Head and face were normal. Hearing was normal to voice and the ears were normal to external exam. Nose appearance was normal and there was no discharge. Oropharynx was normal.  NECK: Neck appearance was normal. There were no neck masses and the thyroid was not enlarged.  RESPIRATORY: Breathing pattern was normal and the chest moved symmetrically.  Percussion/auscultatory percussion was normal.  Lung sounds were normal and there were no abnormal sounds.  CARDIOVASCULAR: Heart rate and rhythm were normal.  S1 and S2 were normal and there were no extra sounds or murmurs. Peripheral pulses in arms and legs were normal.  Jugular venous pressure was normal.  There was no peripheral edema.  GASTROINTESTINAL: The abdomen was normal in " contour.  Bowel sounds were present.  Percussion detected no organ enlargement or tenderness.  Palpation detected no tenderness, mass, or enlarged organs.   MUSCULOSKELETAL: Skeletal configuration was normal and muscle mass was normal for age. Joint appearance was overall normal.  LYMPHATIC: There were no enlarged nodes.  SKIN/HAIR/NAILS: Skin color was normal.  There were no skin lesions.  Hair and nails were normal.  NEUROLOGIC: The patient was alert and oriented to person, place, time, and circumstance. Speech was normal. Cranial nerves were normal. Motor strength was normal for age. The patient was normally coordinated.  PSYCHIATRIC:  Mood and affect were normal and the patient had normal recent and remote memory. The patient's judgment and insight were normal.       Additional Information        Donavon Wolfe MD  Internal Medicine  Contact me at [phone]

## 2021-06-19 NOTE — LETTER
Letter by Donavon Bush MD at      Author: Donavon Bush MD Service: -- Author Type: --    Filed:  Encounter Date: 5/10/2019 Status: (Other)         Neeta Nesbitt  [address] [place] [address]      May 10, 2019      Dear Neeta,    This letter is to remind you that you will be due for your follow up appointment with Dr. Donavon Bush  . To help ensure you are in the best health possible, a regular follow-up with your cardiologist is essential.     Please call our Patient Scheduling Line at [phone] to schedule your appointment at your earliest convenience.  If you have recently scheduled an appointment, please disregard this letter.    We look forward to seeing you again. As always, we are available at the number  above for any questions or concerns you may have.      Sincerely,     The Physicians and Staff of [facility]

## 2021-06-20 NOTE — PROGRESS NOTES
Assessment:     1. URI (upper respiratory infection)            Plan:     Differential diagnosis include but not limited to bacterial bronchitis, viral bronchitis or upper respiratory infection.  Discussed with the patient with her symptoms she does have upper respiratory infection.  Will treat with supportive care including increase fluids, ibuprofen or Tylenol for pain or fever.  Monitor for worsening symptoms.  Follow-up with the PCP if symptoms does not resolve after treatment.  Patient verbalized understanding the plan of care.      Subjective:       62 y.o. female presents for evaluation of feeling unwell ×8 days.  Patient reports that she had her flu shot about a days ago and since then she has been having the symptoms.  She reports feeling fatigued, tired, dry cough, body aches, stomach upset, nonproductive cough, some headaches, and nausea.  She has been taking Tylenol for the achiness, she wakes up with a headache.  She denies a fever, no vomiting or diarrhea.  She also reports some facial pressure but no pain.  Her  is also here with similar symptoms.    The following portions of the patient's history were reviewed and updated as appropriate: allergies, current medications, past family history, past medical history, past social history, past surgical history and problem list.    Review of Systems  A 12 point comprehensive review of systems was negative except as noted.     Objective:      /64 (Patient Site: Left Arm, Patient Position: Sitting, Cuff Size: Adult Regular)  Pulse 61  Temp 97.6  F (36.4  C) (Oral)   Wt 103 lb 3.2 oz (46.8 kg)  SpO2 97%  BMI 17.17 kg/m2  General appearance: alert, appears stated age, cooperative, fatigued and moderate distress  Head: Normocephalic, without obvious abnormality, atraumatic, sinuses nontender to percussion, sinus pressure  Eyes: conjunctivae/corneas clear. PERRL, EOM's intact. Fundi benign.  Ears: normal TM's and external ear canals both  ears  Nose: Nares normal. Septum midline. Mucosa normal. No drainage or sinus tenderness., moderate congestion  Throat: abnormal findings: mild oropharyngeal erythema and Postnasal drainage  Lungs: clear to auscultation bilaterally  Heart: regular rate and rhythm, S1, S2 normal, no murmur, click, rub or gallop  Pulses: 2+ and symmetric  Skin: Skin color, texture, turgor normal. No rashes or lesions  Lymph nodes: Cervical, supraclavicular, and axillary nodes normal.  Neurologic: Grossly normal     This note has been dictated using voice recognition software. Any grammatical or context distortions are unintentional and inherent to the software   Labs/EKG/Imaging Studies

## 2021-06-25 NOTE — PROGRESS NOTES
Progress Notes by Donavon Bush MD at 4/14/2017  8:50 AM     Author: Donavon Bush MD Service: -- Author Type: Physician    Filed: 4/14/2017  9:36 AM Encounter Date: 4/14/2017 Status: Signed    : Donavon Bush MD (Physician)           Click to link to [facility] NOTE    Thank you, Dr. Wolfe, for asking us to see Neeta Nesbitt at the [facility].      Assessment/Recommendations   She with CTA showing very mild plaque and a mildly elevated calcium score.  We reviewed the results of her CTA and highlighted and romario diagrams of stable versus unstable plaque.  We talked about reducing the risk of inflammation and plaque rupture with statin use, aspirin use, exercise, and treating blood pressure if that becomes an issue.    She is on good therapy at this point in time and I would not change her statin even though her LDL cholesterol is 100.  She is tolerating this dose.  I have encouraged her to continue an active lifestyle to call if she has changes in her functional capacity or any symptoms that are brought on with activity and go away with rest.    I will ask her to come back in 2 years to see how she is doing, but of course would be happy to see her sooner if questions or problems arise.         History of Present Illness    Ms. Neeta Nesbitt is a 61 y.o. female with with a dramatic positive family history of coronary artery disease.  She has been evaluated and described some upper chest tightness and irritation.  It is in the left side in the right side and high in her chest.  He is rather constant nature.  It does not get worse with physical activity and she walks on a regular basis on a [address] without difficulty.  Discomfort is been there for 3 or 4 years.  It is almost always there.  She denies orthopnea, paroxysmal nocturnal dyspnea, peripheral edema, syncope or near syncopal episodes.  She denies palpitations.  She has no history  of cerebrovascular accident or TIA, nor does she have a history of rheumatic fever or heart murmur.    Her father had bypass surgery at age 72 mother had bypass surgery at age 70 and she has a brother who is had 8 stents and subsequent bypass surgery and is currently 58.    She is not diabetic, does not smoke cigarettes, and has not been treated for hypertension.  She does take 40 mg of Lipitor which is done for several years with lipid panel noted below.    She grew up in [place].  She is  and has 1 adopted child.    CTA Coronary W Calcium Score   Order# [id number]   Reading physician: Antwon Best MD Ordering physician: Donavon Wolfe MD Study date: 3/28/17   Patient Information   Patient Name MRN Sex              Age   Neeta Nesbitt [id number] Female 1955 (61 y.o.)   Indications   Chest pain, chronic, high probability of CAD   Dx: Familial hyperlipidemia [E78.4 (ICD-10-CM)]; Chest pain, unspecified type [R07.9 (ICD-10-CM)]   Interpretation Summary     The total Agatston calcium score is 4. A calcium score in this range places the individual at the 50th percentile when compared to an age and gender matched control group and implies a low risk of cardiac events in the next ten years (less than 1% per year).    Mild, nonobstructive soft plaque involving the proximal and mid left anterior descending    Abnormal left superior and left inferior pulmonary vein connection. Mild degree of stenosis suggested, with the appearance of a septation involving the left pulmonary vein origins          Physical Examination Review of Systems   Vitals:    17 0846   BP: 132/68   Pulse: 60     Body mass index is 16.64 kg/(m^2).  Wt Readings from Last 3 Encounters:   17 100 lb (45.4 kg)   17 100 lb (45.4 kg)   17 100 lb (45.4 kg)     General Appearance:   Alert, cooperative and in no acute distress.   ENT/Mouth: Oral mucuos membranes pink and moist .      EYES:  No scleral  icterus. No Xanthelasma.    Neck: JVP normal. No Hepatojugular reflux. Thyroid not visualized   Chest/Lungs:   Lungs are clear to auscultation, equal chest wall expansion    Cardiovascular:   S1, S2 without murmur ,clicks or rubs. Brachial, radial and posterior tibial pulses are intact and symetric. No carotid bruits noted   Abdomen:  Nontender. BS+. No bruits.      Extremities: No cyanosis, clubbing or edema   Skin: no xanthelasma, warm.    Psych: Appropriate affect.   Neurologic: normal gait, normal  bilateral, no tremors        General: WNL  Eyes: WNL  Ears/Nose/Throat: WNL  Lungs: Cough  Heart: WNL  Stomach: WNL  Bladder: Frequent Urination at Night  Muscle/Joints: WNL  Skin: WNL  Nervous System: WNL  Mental Health: WNL     Blood: WNL     Medical History  Surgical History Family History Social History   Past Medical History:   Diagnosis Date   ? Hyperlipidemia     Past Surgical History:   Procedure Laterality Date   ? NO PAST SURGERIES      Family History   Problem Relation Age of Onset   ? Pulmonary fibrosis Mother    ? Coronary artery disease Mother    ? Valvular heart disease Mother    ? Alzheimer's disease Father    ? Coronary artery disease Father    ? Hyperlipidemia Sister    ? Coronary artery disease Brother    ? Breast cancer Sister 59   ? No Medical Problems Brother    ? Other Brother      leukodystrophy    Social History     Social History   ? Marital status:      Spouse name: N/A   ? Number of children: N/A   ? Years of education: N/A     Occupational History   ? Not on file.     Social History Main Topics   ? Smoking status: Never Smoker   ? Smokeless tobacco: Never Used   ? Alcohol use 1.2 oz/week     2 Standard drinks or equivalent per week   ? Drug use: No   ? Sexual activity: Yes     Partners: Male     Other Topics Concern   ? Not on file     Social History Narrative    , [place].  Daughter, April. Retired [employer], social work.            Medications  Allergies   Current Outpatient  Prescriptions   Medication Sig Dispense Refill   ? aspirin 81 MG EC tablet Take 1 tablet (81 mg total) by mouth daily.  0   ? ASPIRIN/ACETAMINOPHEN/CAFFEINE (EXCEDRIN MIGRAINE ORAL) Take 2 tablets by mouth as needed.     ? atorvastatin (LIPITOR) 40 MG tablet Take 1 tablet (40 mg total) by mouth daily. 90 tablet 3   ? chlorhexidine (PERIDEX) 0.12 % solution      ? cod liver oil, bulk, 100 % Oil Take 1 tablet by mouth daily.     ? HYDROcodone-acetaminophen 5-325 mg per tablet Take 325 tablets by mouth as needed.     ? ibuprofen (ADVIL,MOTRIN) 600 MG tablet      ? lactobac cmb #3-fos-pantethine 300-250 million cell-mg cap Take 1 tablet by mouth daily.     ? penicillin VK (PEN VK) 500 MG tablet Take 500 mg by mouth as needed.       No current facility-administered medications for this visit.       No Known Allergies      Lab Results    Chemistry/lipid CBC Cardiac Enzymes/BNP/TSH/INR   Lab Results   Component Value Date    CHOL 213 (H) 03/17/2017     03/17/2017    LDLCALC 100 03/17/2017    TRIG 57 03/17/2017    CREATININE 0.75 03/17/2017    BUN 15 03/17/2017    K 4.1 03/17/2017     03/17/2017     03/17/2017    CO2 26 03/17/2017    Lab Results   Component Value Date    WBC 3.5 (L) 03/17/2017    HGB 12.8 03/17/2017    HCT 38.1 03/17/2017    MCV 93 03/17/2017     03/17/2017    Lab Results   Component Value Date    TSH 0.89 03/17/2017

## 2021-06-27 NOTE — PROGRESS NOTES
Progress Notes by Donavon Bush MD at 8/7/2019 10:10 AM     Author: Donavon Bush MD Service: -- Author Type: Physician    Filed: 8/7/2019 10:29 AM Encounter Date: 8/7/2019 Status: Signed    : Donavon Bush MD (Physician)           Click to link to [facility] HEART CARE NOTE    Thank you, Dr. Wolfe, for asking us to see Neeta Nesbitt at the [facility].      Assessment/Recommendations   Patient with known mild plaque noted on CT angiography in the past.  She is on a good dose of statin with an LDL cholesterol of 85.  She takes a baby aspirin each day and her blood pressure is well controlled.  She exercises at least 150 minutes each week.    5 commended on her current lifestyle and asked her to call if she has any changes in her functional capacity.    We will plan on seeing her back in 2 years, but of course would be happy to see her sooner if questions or problems arise.    Thank you for allowing us to participate in her care.         History of Present Illness    Ms. Neeta Nesbitt is a 63 y.o. female with known small amount of plaque in her coronary artery based on CT angiography.  We been treating her with statins, low-dose aspirin as well as regular exercise.  She is been feeling well with the exception of falling in March and fracturing her humerus which required surgery and a plate.  This is been a major event for her.  She did not exercise as much in the.  Afterwards but she is doing her 1 mile loop now without difficulties although some of the heels feel a little more difficult to get up.  She has not had any chest discomfort and also denies shortness of breath.  She denies orthopnea, paroxysmal nocturnal dyspnea, peripheral edema, syncope or near syncopal episodes.  Lipids are improved and blood pressures excellent today.       Physical Examination Review of Systems   Vitals:    08/07/19 0956   BP: 120/72   Pulse: 64   Resp: 16     Body mass  index is 18.19 kg/m .  Wt Readings from Last 3 Encounters:   08/07/19 106 lb (48.1 kg)   05/22/19 105 lb (47.6 kg)   03/23/19 105 lb 6.4 oz (47.8 kg)     General Appearance:   Alert, cooperative and in no acute distress.   ENT/Mouth: Oral mucuos membranes pink and moist .      EYES:  No scleral icterus. No Xanthelasma.    Neck: JVP normal. No Hepatojugular reflux. Thyroid not visualized   Chest/Lungs:   Lungs are clear to auscultation, equal chest wall expansion    Cardiovascular:   S1, S2 without murmur ,clicks or rubs. Brachial, radial and posterior tibial pulses are intact and symetric. No carotid bruits noted   Abdomen:  Nontender. BS+.       Extremities: No cyanosis, clubbing or edema   Skin: no xanthelasma, warm.    Psych: Appropriate affect.   Neurologic: normal gait, normal  bilateral, no tremors        General: WNL  Eyes: WNL  Ears/Nose/Throat: WNL  Lungs: WNL  Heart: WNL  Stomach: WNL  Bladder: WNL  Muscle/Joints: WNL  Skin: WNL  Nervous System: Falls(Broken Femur from a fall in the drive way back in March of 2018. )  Mental Health: WNL     Blood: WNL     Medical History  Surgical History Family History Social History   Past Medical History:   Diagnosis Date   ? Hyperlipidemia     Past Surgical History:   Procedure Laterality Date   ? ORIF HUMERUS FRACTURE Left 03/2019    Family History   Problem Relation Age of Onset   ? Pulmonary fibrosis Mother    ? Coronary artery disease Mother    ? Valvular heart disease Mother    ? Alzheimer's disease Father    ? Coronary artery disease Father    ? Hyperlipidemia Sister    ? Coronary artery disease Brother    ? Breast cancer Sister 59   ? No Medical Problems Brother    ? Other Brother         [email or web address]    Social History     Socioeconomic History   ? Marital status:      Spouse name: Not on file   ? Number of children: Not on file   ? Years of education: Not on file   ? Highest education level: Not on file   Occupational History   ? Not on file    Social Needs   ? Financial resource strain: Not on file   ? Food insecurity:     Worry: Not on file     Inability: Not on file   ? Transportation needs:     Medical: Not on file     Non-medical: Not on file   Tobacco Use   ? Smoking status: Never Smoker   ? Smokeless tobacco: Never Used   Substance and Sexual Activity   ? Alcohol use: Yes     Alcohol/week: 1.2 oz     Types: 2 Standard drinks or equivalent per week   ? Drug use: No   ? Sexual activity: Yes     Partners: Male   Lifestyle   ? Physical activity:     Days per week: Not on file     Minutes per session: Not on file   ? Stress: Not on file   Relationships   ? Social connections:     Talks on phone: Not on file     Gets together: Not on file     Attends Sikhism service: Not on file     Active member of club or organization: Not on file     Attends meetings of clubs or organizations: Not on file     Relationship status: Not on file   ? Intimate partner violence:     Fear of current or ex partner: Not on file     Emotionally abused: Not on file     Physically abused: Not on file     Forced sexual activity: Not on file   Other Topics Concern   ? Not on file   Social History Narrative    , [place].  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            Medications  Allergies   Current Outpatient Medications   Medication Sig Dispense Refill   ? aspirin 81 MG EC tablet Take 1 tablet (81 mg total) by mouth daily.  0   ? aspirin-acetaminophen-caffeine (EXCEDRIN MIGRAINE) 250-250-65 mg per tablet Take 2 tablets by mouth every 6 (six) hours as needed for pain.     ? atorvastatin (LIPITOR) 40 MG tablet Take 1 tablet (40 mg total) by mouth daily. 90 tablet 3   ? lactobac cmb #3-fos-pantethine 300-250 million cell-mg cap Take 1 tablet by mouth daily.     ? omega-3/dha/epa/fish oil (FISH OIL-OMEGA-3 FATTY ACIDS) 300-1,000 mg capsule Take 1 g by mouth daily.     ? acetaminophen (TYLENOL) 500 MG tablet Take 2 tablets (1,000 mg total) by mouth 3 (three) times a  day. (Patient taking differently: Take 500 mg by mouth as needed.       )  0   ? ibuprofen (ADVIL) 200 MG tablet Take 400 mg by mouth as needed for pain.     ? LORazepam (ATIVAN) 0.5 MG tablet Take 1 tablet (0.5 mg total) by mouth at bedtime as needed for anxiety. 30 tablet 0   ? meloxicam (MOBIC) 15 MG tablet Take 1 tablet (15 mg total) by mouth daily. 15 tablet 0     No current facility-administered medications for this visit.       No Known Allergies      Lab Results    Chemistry/lipid CBC Cardiac Enzymes/BNP/TSH/INR   Lab Results   Component Value Date    CHOL 183 05/24/2019    HDL 90 05/24/2019    LDLCALC 85 05/24/2019    TRIG 41 05/24/2019    CREATININE 0.71 05/24/2019    BUN 15 05/24/2019    K 4.3 05/24/2019     05/24/2019     05/24/2019    CO2 26 05/24/2019    Lab Results   Component Value Date    WBC 5.0 06/20/2019    HGB 12.0 06/20/2019    HCT 35.9 06/20/2019    MCV 94 06/20/2019     06/20/2019    Lab Results   Component Value Date    TSH 1.00 05/24/2019    INR 1.05 03/24/2019

## 2021-07-03 NOTE — ADDENDUM NOTE
Addendum Note by Donavon Isbell MD at 3/21/2017  7:58 AM     Author: Donavon Isbell MD Service: -- Author Type: Physician    Filed: 3/21/2017  7:58 AM Encounter Date: 3/17/2017 Status: Signed    : Donavon Isbell MD (Physician)    Addended by: DONAVON ISBELL on: 3/21/2017 07:58 AM        Modules accepted: Orders

## 2021-08-14 ENCOUNTER — HEALTH MAINTENANCE LETTER (OUTPATIENT)
Age: 66
End: 2021-08-14

## 2021-10-10 ENCOUNTER — HEALTH MAINTENANCE LETTER (OUTPATIENT)
Age: 66
End: 2021-10-10

## 2021-11-08 ENCOUNTER — MEDICAL CORRESPONDENCE (OUTPATIENT)
Dept: HEALTH INFORMATION MANAGEMENT | Facility: CLINIC | Age: 66
End: 2021-11-08
Payer: COMMERCIAL

## 2021-11-30 ENCOUNTER — OFFICE VISIT (OUTPATIENT)
Dept: FAMILY MEDICINE | Facility: CLINIC | Age: 66
End: 2021-11-30
Payer: COMMERCIAL

## 2021-11-30 VITALS
RESPIRATION RATE: 16 BRPM | WEIGHT: 107.9 LBS | HEIGHT: 65 IN | HEART RATE: 58 BPM | TEMPERATURE: 98 F | DIASTOLIC BLOOD PRESSURE: 78 MMHG | BODY MASS INDEX: 17.98 KG/M2 | SYSTOLIC BLOOD PRESSURE: 122 MMHG

## 2021-11-30 DIAGNOSIS — M81.0 OSTEOPOROSIS WITHOUT CURRENT PATHOLOGICAL FRACTURE, UNSPECIFIED OSTEOPOROSIS TYPE: Primary | ICD-10-CM

## 2021-11-30 DIAGNOSIS — I25.10 CORONARY ARTERY DISEASE INVOLVING NATIVE CORONARY ARTERY OF NATIVE HEART WITHOUT ANGINA PECTORIS: ICD-10-CM

## 2021-11-30 DIAGNOSIS — K92.2 UPPER GASTROINTESTINAL HEMORRHAGE: ICD-10-CM

## 2021-11-30 DIAGNOSIS — E55.9 VITAMIN D DEFICIENCY: ICD-10-CM

## 2021-11-30 DIAGNOSIS — K21.9 GASTROESOPHAGEAL REFLUX DISEASE WITHOUT ESOPHAGITIS: ICD-10-CM

## 2021-11-30 PROBLEM — M85.80 OSTEOPENIA: Status: RESOLVED | Noted: 2017-03-19 | Resolved: 2021-11-30

## 2021-11-30 PROBLEM — D12.4 BENIGN NEOPLASM OF DESCENDING COLON: Status: ACTIVE | Noted: 2017-05-25

## 2021-11-30 PROBLEM — K44.9 DIAPHRAGMATIC HERNIA: Status: ACTIVE | Noted: 2020-06-11

## 2021-11-30 PROBLEM — K44.9 HIATAL HERNIA: Status: ACTIVE | Noted: 2020-06-15

## 2021-11-30 LAB
CRP SERPL HS-MCNC: 0.4 MG/L (ref 0–3)
ERYTHROCYTE [DISTWIDTH] IN BLOOD BY AUTOMATED COUNT: 12 % (ref 10–15)
FERRITIN SERPL-MCNC: 149 NG/ML (ref 10–130)
HCT VFR BLD AUTO: 37.7 % (ref 35–47)
HGB BLD-MCNC: 12.2 G/DL (ref 11.7–15.7)
MCH RBC QN AUTO: 31.5 PG (ref 26.5–33)
MCHC RBC AUTO-ENTMCNC: 32.4 G/DL (ref 31.5–36.5)
MCV RBC AUTO: 97 FL (ref 78–100)
PLATELET # BLD AUTO: 223 10E3/UL (ref 150–450)
RBC # BLD AUTO: 3.87 10E6/UL (ref 3.8–5.2)
VIT B12 SERPL-MCNC: 624 PG/ML (ref 213–816)
WBC # BLD AUTO: 5.2 10E3/UL (ref 4–11)

## 2021-11-30 PROCEDURE — 36415 COLL VENOUS BLD VENIPUNCTURE: CPT | Performed by: FAMILY MEDICINE

## 2021-11-30 PROCEDURE — 99215 OFFICE O/P EST HI 40 MIN: CPT | Performed by: FAMILY MEDICINE

## 2021-11-30 PROCEDURE — 99000 SPECIMEN HANDLING OFFICE-LAB: CPT | Performed by: FAMILY MEDICINE

## 2021-11-30 PROCEDURE — 86141 C-REACTIVE PROTEIN HS: CPT | Performed by: FAMILY MEDICINE

## 2021-11-30 PROCEDURE — 83735 ASSAY OF MAGNESIUM: CPT | Mod: 90 | Performed by: FAMILY MEDICINE

## 2021-11-30 PROCEDURE — 85027 COMPLETE CBC AUTOMATED: CPT | Performed by: FAMILY MEDICINE

## 2021-11-30 PROCEDURE — 84630 ASSAY OF ZINC: CPT | Mod: 90 | Performed by: FAMILY MEDICINE

## 2021-11-30 PROCEDURE — 82728 ASSAY OF FERRITIN: CPT | Performed by: FAMILY MEDICINE

## 2021-11-30 PROCEDURE — 82607 VITAMIN B-12: CPT | Performed by: FAMILY MEDICINE

## 2021-11-30 PROCEDURE — 82306 VITAMIN D 25 HYDROXY: CPT | Performed by: FAMILY MEDICINE

## 2021-11-30 RX ORDER — LOTEPREDNOL ETABONATE 5 MG/ML
1-2 SUSPENSION/ DROPS OPHTHALMIC 4 TIMES DAILY
COMMUNITY

## 2021-11-30 RX ORDER — LOVASTATIN 40 MG
80 TABLET ORAL AT BEDTIME
COMMUNITY

## 2021-11-30 RX ORDER — PANTOPRAZOLE SODIUM 40 MG/1
40 TABLET, DELAYED RELEASE ORAL DAILY
COMMUNITY
Start: 2020-04-06

## 2021-11-30 ASSESSMENT — MIFFLIN-ST. JEOR: SCORE: 1025.31

## 2021-11-30 NOTE — PROGRESS NOTES
Neeta was seen today for gi problem.    Diagnoses and all orders for this visit:    Osteoporosis without current pathological fracture, unspecified osteoporosis type  -     Magnesium RBC  Handout on supplements / diet support for osteoporosis.  Increase nutrient rich foods - plant based diet. Will check labs then make recommendations to optimize her supplements.  If she decides to hold off on Prolia I would recheck her dexa scan in 1 year to see if diet/ supplements are stabilizing her bone loss.    Upper gastrointestinal hemorrhage  -     CBC with platelets  -     Ferritin    Vitamin D deficiency  -     Vitamin D Deficiency    Gastroesophageal reflux disease without esophagitis - Trial of zinc carnosine.  Recommendations to support digestive enzymes.  If symptoms improve, consider a trial of cutting back on her PPI - would be nice to be able to switch to H2 blocker or even wean completely in future. Could also consider Jaylen GI Stool Effects testing if not better.  -     Zinc  -     Vitamin B12    Coronary artery disease involving native coronary artery of native heart without angina pectoris  -     C-Reactive Protein, High Sensitivity  I recommend she add CoQ10 since taking a statin and experiencing fatigue.      Patient Instructions   To improve your digestion:    No not drink fluids 20 minutes before and for 20 minutes after major meals.  Excess fluid with meals will dilute your digestive enzymes and cold drinks with meals will slow down the digestive reaction.    Consider taking digestive bitters with meals or starting meals with a salad of bitter greens like arugula  ([employer]- can purchase at co-ops).  Bitter foods help stimulate digestion.  You can also consider a small amount of Александр's cider vinegar before meals.    Eat mindfully and slowly.  Be aware of your mental state - Digestion shuts down when you are in a stressed state.    Try to leave a 4 hour window between meals and stop eating 3 hours  before bedtime.  Your body needs time to process the food you eat and move it through the digestive tract using the migrating motor complex which creates waves of activity after eating - kind of a housekeeping action.        [employer] is a company that carries high quality supplements.  In order to purchase the supplements you will need to follow these instructions:  1) Go to website: [email or web address].[email or web address]  2) [place] and enter the following account number: [id number]  3) Search for the recommended supplements to make a purchase.     Zinc carnosine - Take one capsule twice daily between meals    CoQ 10 100 mg - Take one daily ( helps with energy due to taking stain )        SUBJECTIVE: Neeta Nesbitt is a 66 year old female who presents for a functional medicine consult with the following concerns:    1) She had GI bleed in February 2020. She took baby asa daily.  Had dental surgery before the GI bleed and was taking both asa and ibuprofen.  She had syncope / vomiting and was found to have GI bleed. Was hospitalized due to low hemoglobin- down to 4.7 and had transfusion.  EGD showed non-bleeding gastric ulcer.  She feels she is not back to normal since the GI bleed.  Has persistent heartburn so she is taking omeprazole 40 mg in am but not always effective.  She gets tightness in chest and burning in mid abdomen.  She had recent testing to r/o cardiac issues.  Feels bloated after eating.  Has daily bowel movement and softer.  No diarrhea.    2) Osteoporosis - Just diagnosed this spring.  Prolia recommended.  She took Fosamax in past.  She had humeral fracture after a fall.  No loss of height.  No prolonged use of prednisone.  She would prefer not to do the injections. She is taking a supplement for her bones.    3) Fatigue - Going on for long time.  Worse in last year.  Wakes up at night.  Does not feel fully rested in am.      4) She has been on statin for a long time due to elevated cholesterol.  CT angiogram showed mild  nonobstructive soft plaque in proximal and mid LAD coronaries.  No diabetes mellitus / smoking or HTN.    TIMELINE:    Antecedents ( Preconception / prenatal ): No concerns, [place]  Triggers:  Birth - , unsure if breast fed  Early childhood-  No recurrent infections  Adolescence - no concern  Young adulthood - Went to college and majored in sociology/ political science,  Infertility in late 20s - did work-up, had one surgery to remove cyst, injections but no IVF.  Adulthood-   Patient Active Problem List   Diagnosis     Hyperlipidemia     Coronary artery disease involving native coronary artery without angina pectoris     Closed fracture of proximal end of left humerus, unspecified fracture morphology, initial encounter     Benign neoplasm of descending colon     Diaphragmatic hernia     Hiatal hernia     Other psoriasis     Upper gastrointestinal hemorrhage     Osteoporosis without current pathological fracture, unspecified osteoporosis type     Gastroesophageal reflux disease without esophagitis        Mediators/ Perpetuators:    SH: Household-  with adopted daughter.  Adopted age 16 months.    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  Sleep - Goes to bed 9:30 to 10 pm.  Falls asleep ok.  Wakes up early- goes to bathroom 2x.  Does not feel she gets good rest in early am.    Movement - Walking/ hiking.  Nutrition - B - English muffin with almond butter/ oatmeal/ eggs/ green tea.  L- sandwich or wrap with meat / leftovers / fruit/ cookie.  D- chicken / beans / salad / vegetable/ potatoes.  Occasional small glass of wine. No snacking.    Stressors - daughter / family  Spirituality - attend [employer]  Social connection - limited but close to 2 sisters    No toxic exposure / foreign travel    Past treatments:  Medical- meds  Complementary - Did elimination diet with Natalia Green and coffee/ alcohol bothered her the most.  Also limits dairy.      Supplements: Bone - Up  "calcium 1000 mg/ magnesium 500 mg / vitamin D 1000 international unit(s) / K2 45 mg     MSQ: 32  Perceived Stress Scale: 10      OBJECTIVE: /78   Pulse 58   Temp 98  F (36.7  C) (Oral)   Resp 16   Ht 1.651 m (5' 5\")   Wt 48.9 kg (107 lb 14.4 oz)   BMI 17.96 kg/m   no distress  GENERAL: Healthy, alert and no distress  EYES: Eyes grossly normal to inspection.  No discharge or erythema, or obvious scleral/conjunctival abnormalities.  RESP: No audible wheeze, cough, or visible cyanosis.  No visible retractions or increased work of breathing.    SKIN: Visible skin clear. No significant rash, abnormal pigmentation or lesions.  NEURO: Cranial nerves grossly intact.  Mentation and speech appropriate for age.  PSYCH: Mentation appears normal, affect normal/bright, judgement and insight intact, normal speech and appearance well-groomed.      LAB:   Recent labs reviewed - last ferritin/ HgB normal.  Negative H pylori / negative TTG testing    Total time spent with patient: 60 minutes face to face / reviewing past data/ discussion of plan of care/ documentation.    Margarita Diaz MD                     "

## 2021-11-30 NOTE — PATIENT INSTRUCTIONS
To improve your digestion:    No not drink fluids 20 minutes before and for 20 minutes after major meals.  Excess fluid with meals will dilute your digestive enzymes and cold drinks with meals will slow down the digestive reaction.    Consider taking digestive bitters with meals or starting meals with a salad of bitter greens like arugula  ([employer] [place]- can purchase at co-ops).  Bitter foods help stimulate digestion.  You can also consider a small amount of Александр's cider vinegar before meals.    Eat mindfully and slowly.  Be aware of your mental state - Digestion shuts down when you are in a stressed state.    Try to leave a 4 hour window between meals and stop eating 3 hours before bedtime.  Your body needs time to process the food you eat and move it through the digestive tract using the migrating motor complex which creates waves of activity after eating - kind of a housekeeping action.        [employer] is a company that carries high quality supplements.  In order to purchase the supplements you will need to follow these instructions:  1) Go to website: nutridyn.com  2) [place] and enter the following account number: [id number]  3) Search for the recommended supplements to make a purchase.     Zinc carnosine - Take one capsule twice daily between meals    CoQ 10 100 mg - Take one daily ( helps with energy due to taking stain )

## 2021-12-01 LAB — DEPRECATED CALCIDIOL+CALCIFEROL SERPL-MC: 44 UG/L (ref 30–80)

## 2021-12-04 LAB
MAGNESIUM RBC-SCNC: 5.3 MG/DL
ZINC SERPL-MCNC: 73.7 UG/DL

## 2022-01-24 ENCOUNTER — VIRTUAL VISIT (OUTPATIENT)
Dept: FAMILY MEDICINE | Facility: CLINIC | Age: 67
End: 2022-01-24
Payer: COMMERCIAL

## 2022-01-24 DIAGNOSIS — M81.0 OSTEOPOROSIS WITHOUT CURRENT PATHOLOGICAL FRACTURE, UNSPECIFIED OSTEOPOROSIS TYPE: ICD-10-CM

## 2022-01-24 DIAGNOSIS — K21.9 GASTROESOPHAGEAL REFLUX DISEASE WITHOUT ESOPHAGITIS: Primary | ICD-10-CM

## 2022-01-24 PROCEDURE — 99213 OFFICE O/P EST LOW 20 MIN: CPT | Mod: 95 | Performed by: FAMILY MEDICINE

## 2022-01-24 RX ORDER — UBIDECARENONE 100 MG
100 CAPSULE ORAL DAILY
COMMUNITY

## 2022-01-24 RX ORDER — EZETIMIBE 10 MG/1
TABLET ORAL
COMMUNITY
Start: 2021-12-02

## 2022-01-24 RX ORDER — CHLORAL HYDRATE 500 MG
2 CAPSULE ORAL DAILY
COMMUNITY

## 2022-01-24 NOTE — PROGRESS NOTES
Neeta is a 66 year old who is being evaluated via a billable video visit.      How would you like to obtain your AVS? MyChart  If the video visit is dropped, the invitation should be resent by: Send to e-mail at: [email or web address]@"[email or web address]  Will anyone else be joining your video visit? No    Video Start Time: 2:20 pm    Assessment & Plan     Gastroesophageal reflux disease without esophagitis  Retry zinc carnosine / apple cider vinegar.  Check Jaylen GI Stool Effects test.    Osteoporosis without current pathological fracture, unspecified osteoporosis type  Continue current supplements.  Check dexa scan fall 2022.    Patient Instructions   Retry the zinc carnosine supplement - one twice daily in between meals.    After a week, can retry apple cider vinegar - 1 tsp with meals.    Here is information about the [facility] GI Effects Comprehensive Stool profile:    1) The kit will be sent to your home.  I do not need to sign the requisition form.     2) Follow directions to collect the stool and then send the kit back as directed.      3) You do need to include payment.                        No follow-ups on file.    Margarita Diaz MD  [facility]   Neeta is a 66 year old who presents for the following health issues     HPI She is here to f/u on FM consult:    1) GERD - She started on digestive bitter supplement and had burning in back of throat.  Stopped Dec 20 but still had burning and had issues with swallowing her supplements.  On Dec 23, stopped all supplements except fish oil / Bone Up and CoQ 10.  She feels her throat is better.  She continues on Protonix 40 mg daily. She feels her heartburn is starting to build back up again.  It was recommended she try zinc carnosine but stopped when she cut down all her supplements.  She tried apple cider vinegar and took with bitters and had burning.      2) Osteoporosis - She is taking Bone Up supplement.  Has increased her vegetables.   Pt would like to hold off on Prolia.  Vitamin D level was 44.              Review of Systems         Objective           Vitals:  No vitals were obtained today due to virtual visit.    Physical Exam   GENERAL: Healthy, alert and no distress  EYES: Eyes grossly normal to inspection.  No discharge or erythema, or obvious scleral/conjunctival abnormalities.  RESP: No audible wheeze, cough, or visible cyanosis.  No visible retractions or increased work of breathing.    SKIN: Visible skin clear. No significant rash, abnormal pigmentation or lesions.  NEURO: Cranial nerves grossly intact.  Mentation and speech appropriate for age.  PSYCH: Mentation appears normal, affect normal/bright, judgement and insight intact, normal speech and appearance well-groomed.    Previous lab work reviewed.          Video-Visit Details    Type of service:  Video Visit    Video End Time:2:44 PM    Originating Location (pt. Location): Home    Distant Location (provider location):  [facility]     Platform used for Video Visit: [email or web address]

## 2022-01-24 NOTE — PATIENT INSTRUCTIONS
Retry the zinc carnosine supplement - one twice daily in between meals.    After a week, can retry apple cider vinegar - 1 tsp with meals.    Here is information about the [facility] GI Effects Comprehensive Stool profile:    1) The kit will be sent to your home.  I do not need to sign the requisition form.     2) Follow directions to collect the stool and then send the kit back as directed.      3) You do need to include payment.

## 2022-02-07 ENCOUNTER — TRANSFERRED RECORDS (OUTPATIENT)
Dept: HEALTH INFORMATION MANAGEMENT | Facility: CLINIC | Age: 67
End: 2022-02-07
Payer: COMMERCIAL

## 2022-03-01 ENCOUNTER — OFFICE VISIT (OUTPATIENT)
Dept: FAMILY MEDICINE | Facility: CLINIC | Age: 67
End: 2022-03-01
Payer: COMMERCIAL

## 2022-03-01 VITALS
BODY MASS INDEX: 18.51 KG/M2 | SYSTOLIC BLOOD PRESSURE: 112 MMHG | HEART RATE: 69 BPM | HEIGHT: 65 IN | WEIGHT: 111.1 LBS | OXYGEN SATURATION: 98 % | DIASTOLIC BLOOD PRESSURE: 68 MMHG

## 2022-03-01 DIAGNOSIS — K92.9 GASTROINTESTINAL DISORDER: Primary | ICD-10-CM

## 2022-03-01 DIAGNOSIS — K21.9 GASTROESOPHAGEAL REFLUX DISEASE WITHOUT ESOPHAGITIS: ICD-10-CM

## 2022-03-01 PROCEDURE — 99213 OFFICE O/P EST LOW 20 MIN: CPT | Performed by: FAMILY MEDICINE

## 2022-03-01 RX ORDER — CYCLOSPORINE 0.5 MG/ML
EMULSION OPHTHALMIC
COMMUNITY
Start: 2022-02-15

## 2022-03-01 NOTE — PROGRESS NOTES
Neeta was seen today for results.    Diagnoses and all orders for this visit:    Gastrointestinal disorder- She has potential pathogen with proteus and rare blastomycosis on test.  I doubt the blastomycosis is symptomatic.  Will treat with herbal antibiotics for dysbiosis to re-balance microbiome.    Gastroesophageal reflux disease without esophagitis      Patient Instructions   To re-balance the microbiome I recommend a month of Biocidin:    [employer] is a company that carries high quality supplements.  In order to purchase the supplements you will need to follow these instructions:  1) Go to website: nutridyn.com  2) [place] and enter the following account number: [id number]  3) Search for the recommended supplements to make a purchase.    Biocidin liquid - Start with 2 drops 3x a day before meals and work up to 5 drops 3x a day over a week.  You can mix with a small amount of water or just drop into your mouth.  ( herbal antibiotic )    Increase diversity of plant based foods in diet.    Add in fermented vegetables - 1 TBSP on daily or every other day basis.    Update me on how you feel after you finish the course of Biocidin.           SUBJECTIVE: Neeta Nesbitt is a 66 year old female who presents with the following:  Patient presents with:  Results: f/u  She is here to f/u on Jaylen GI Effects Stool profile.    She has h/o GERD.  She retried zinc carnosine / apple cider vinegar/ bitters and had more pain/ selling in her anterior cervical lymph nodes.  She is also taking Protonix 40 mg daily.  Reflux doing better now.  Less stress.     Answers for HPI/ROS submitted by the patient on 2/24/2022  How many servings of fruits and vegetables do you eat daily?: 4 or more  On average, how many sweetened beverages do you drink each day (Examples: soda, juice, sweet tea, etc.  Do NOT count diet or artificially sweetened beverages)?: 0  How many minutes a day do you exercise enough to make your heart beat faster?: 30 to 60  How  "many days a week do you exercise enough to make your heart beat faster?: 5  How many days per week do you miss taking your medication?: 0  What is the reason for your visit today?: Review lab test results        OBJECTIVE: /68 (BP Location: Left arm, Patient Position: Sitting, Cuff Size: Adult Regular)   Pulse 69   Ht 1.651 m (5' 5\")   Wt 50.4 kg (111 lb 1.6 oz)   SpO2 98%   BMI 18.49 kg/m   no distress  GENERAL: Healthy, alert and no distress  EYES: Eyes grossly normal to inspection.  No discharge or erythema, or obvious scleral/conjunctival abnormalities.  RESP: No audible wheeze, cough, or visible cyanosis.  No visible retractions or increased work of breathing.    SKIN: Visible skin clear. No significant rash, abnormal pigmentation or lesions.  NEURO: Cranial nerves grossly intact.  Mentation and speech appropriate for age.  PSYCH: Mentation appears normal, affect normal/bright, judgement and insight intact, normal speech and appearance well-groomed.    Jaylen GI Effects stool test: malabsorption- 0 / inflammation - 0 / dysbiosis - 6/ metabolites- 0 / infection - 5      Margarita Diaz MD       "

## 2022-03-01 NOTE — PATIENT INSTRUCTIONS
To re-balance the microbiome I recommend a month of Biocidin:    [employer] is a company that carries high quality supplements.  In order to purchase the supplements you will need to follow these instructions:  1) Go to website: nutridyn.com  2) [place] and enter the following account number: [id number]  3) Search for the recommended supplements to make a purchase.    Biocidin liquid - Start with 2 drops 3x a day before meals and work up to 5 drops 3x a day over a week.  You can mix with a small amount of water or just drop into your mouth.  ( herbal antibiotic )    Increase diversity of plant based foods in diet.    Add in fermented vegetables - 1 TBSP on daily or every other day basis.    Update me on how you feel after you finish the course of Biocidin.

## 2022-09-18 ENCOUNTER — HEALTH MAINTENANCE LETTER (OUTPATIENT)
Age: 67
End: 2022-09-18

## 2023-05-06 ENCOUNTER — HEALTH MAINTENANCE LETTER (OUTPATIENT)
Age: 68
End: 2023-05-06

## 2023-07-21 ENCOUNTER — TELEPHONE (OUTPATIENT)
Dept: FAMILY MEDICINE | Facility: CLINIC | Age: 68
End: 2023-07-21
Payer: COMMERCIAL

## 2023-07-21 NOTE — TELEPHONE ENCOUNTER
Patient Quality Outreach    Patient is due for the following:   Physical Annual Wellness Visit    Next Steps:   No follow up needed at this time.     NOTE: Last medicare annual visit was done with [facility] x 2/13/23 in care everywhere. [facility] updated. xl    Type of outreach:    Chart review performed, no outreach needed.      Questions for provider review:    None           Hu Caro

## 2024-01-18 ENCOUNTER — PATIENT OUTREACH (OUTPATIENT)
Dept: CARE COORDINATION | Facility: CLINIC | Age: 69
End: 2024-01-18
Payer: COMMERCIAL

## 2024-02-01 ENCOUNTER — PATIENT OUTREACH (OUTPATIENT)
Dept: CARE COORDINATION | Facility: CLINIC | Age: 69
End: 2024-02-01
Payer: COMMERCIAL

## 2024-04-18 ENCOUNTER — PATIENT OUTREACH (OUTPATIENT)
Dept: CARE COORDINATION | Facility: CLINIC | Age: 69
End: 2024-04-18
Payer: COMMERCIAL

## 2024-05-16 ENCOUNTER — PATIENT OUTREACH (OUTPATIENT)
Dept: CARE COORDINATION | Facility: CLINIC | Age: 69
End: 2024-05-16
Payer: COMMERCIAL

## 2024-09-21 ENCOUNTER — HEALTH MAINTENANCE LETTER (OUTPATIENT)
Age: 69
End: 2024-09-21

## 2025-01-22 ENCOUNTER — PATIENT OUTREACH (OUTPATIENT)
Dept: CARE COORDINATION | Facility: CLINIC | Age: 70
End: 2025-01-22
Payer: COMMERCIAL